# Patient Record
Sex: MALE | Race: OTHER | NOT HISPANIC OR LATINO | URBAN - METROPOLITAN AREA
[De-identification: names, ages, dates, MRNs, and addresses within clinical notes are randomized per-mention and may not be internally consistent; named-entity substitution may affect disease eponyms.]

---

## 2017-07-13 ENCOUNTER — EMERGENCY (EMERGENCY)
Facility: HOSPITAL | Age: 22
LOS: 1 days | Discharge: HOME | End: 2017-07-13
Attending: PEDIATRICS | Admitting: PEDIATRICS
Payer: COMMERCIAL

## 2017-07-13 VITALS
OXYGEN SATURATION: 99 % | SYSTOLIC BLOOD PRESSURE: 138 MMHG | DIASTOLIC BLOOD PRESSURE: 90 MMHG | RESPIRATION RATE: 17 BRPM | TEMPERATURE: 99 F | HEART RATE: 69 BPM

## 2017-07-13 VITALS
SYSTOLIC BLOOD PRESSURE: 109 MMHG | OXYGEN SATURATION: 98 % | DIASTOLIC BLOOD PRESSURE: 69 MMHG | TEMPERATURE: 99 F | HEART RATE: 73 BPM | RESPIRATION RATE: 18 BRPM

## 2017-07-13 PROCEDURE — 99282 EMERGENCY DEPT VISIT SF MDM: CPT

## 2017-07-13 PROCEDURE — 99283 EMERGENCY DEPT VISIT LOW MDM: CPT
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
## 2019-06-30 ENCOUNTER — EMERGENCY (EMERGENCY)
Facility: HOSPITAL | Age: 24
LOS: 1 days | Discharge: HOME | End: 2019-06-30
Attending: EMERGENCY MEDICINE
Payer: MEDICAID

## 2019-06-30 VITALS — HEIGHT: 64 IN | WEIGHT: 134.92 LBS

## 2019-06-30 VITALS
SYSTOLIC BLOOD PRESSURE: 125 MMHG | DIASTOLIC BLOOD PRESSURE: 75 MMHG | TEMPERATURE: 99 F | HEART RATE: 80 BPM | RESPIRATION RATE: 18 BRPM | OXYGEN SATURATION: 98 %

## 2019-06-30 PROCEDURE — 99283 EMERGENCY DEPT VISIT LOW MDM: CPT | Mod: 25

## 2019-06-30 PROCEDURE — 90715 TDAP VACCINE 7 YRS/> IM: CPT

## 2019-06-30 PROCEDURE — 99283 EMERGENCY DEPT VISIT LOW MDM: CPT

## 2019-06-30 PROCEDURE — 90471 IMMUNIZATION ADMIN: CPT

## 2019-06-30 PROCEDURE — 73130 X-RAY EXAM OF HAND: CPT

## 2019-06-30 PROCEDURE — 73130 X-RAY EXAM OF HAND: CPT | Mod: 26,RT

## 2019-06-30 RX ORDER — TETANUS TOXOID, REDUCED DIPHTHERIA TOXOID AND ACELLULAR PERTUSSIS VACCINE, ADSORBED 5; 2.5; 8; 8; 2.5 [IU]/.5ML; [IU]/.5ML; UG/.5ML; UG/.5ML; UG/.5ML
0.5 SUSPENSION INTRAMUSCULAR ONCE
Refills: 0 | Status: COMPLETED | OUTPATIENT
Start: 2019-06-30 | End: 2019-06-30

## 2019-06-30 RX ORDER — ACETAMINOPHEN 500 MG
650 TABLET ORAL ONCE
Refills: 0 | Status: COMPLETED | OUTPATIENT
Start: 2019-06-30 | End: 2019-06-30

## 2019-06-30 RX ORDER — IBUPROFEN 200 MG
600 TABLET ORAL ONCE
Refills: 0 | Status: COMPLETED | OUTPATIENT
Start: 2019-06-30 | End: 2019-06-30

## 2019-06-30 RX ADMIN — TETANUS TOXOID, REDUCED DIPHTHERIA TOXOID AND ACELLULAR PERTUSSIS VACCINE, ADSORBED 0.5 MILLILITER(S): 5; 2.5; 8; 8; 2.5 SUSPENSION INTRAMUSCULAR at 15:48

## 2019-06-30 RX ADMIN — Medication 600 MILLIGRAM(S): at 15:48

## 2019-06-30 RX ADMIN — Medication 650 MILLIGRAM(S): at 15:48

## 2019-06-30 RX ADMIN — Medication 650 MILLIGRAM(S): at 16:15

## 2019-06-30 RX ADMIN — Medication 600 MILLIGRAM(S): at 16:15

## 2019-06-30 NOTE — ED PROVIDER NOTE - NSFOLLOWUPINSTRUCTIONS_ED_ALL_ED_FT
Follow-up with your Primary Care Physician within 2 - 5 days.  Please return to the Emergency Department immediately for any new, worsening or concerning symptoms.    Can use Tylenol (up to 1000mg every 6 hours) or Ibuprofen (up to 600mg every 6 hours) as per package directions for pain - use only as needed.  These are over-the-counter medications - please respect the warnings on the label.

## 2019-06-30 NOTE — ED ADULT NURSE REASSESSMENT NOTE - NS ED NURSE REASSESS COMMENT FT1
Pt reports pain improvement following tylenol/motrin administration. Hever FINK state no fracture on XR. Hever FINK ace bandage right hand and place right arm in sling to keep right hand elevated to decrease swelling. Pt discharged home to follow up with PMD. CHANDRIKAS

## 2019-06-30 NOTE — ED PROVIDER NOTE - OBJECTIVE STATEMENT
23 year-old male p/w right palm pain after catching a ball while playing baseball; pain localized to the thenar eminence approx 5 hours PTA.  No numbness/tingling or weakness in fingers.  No other trauma/falls reported.  + swelling and pain; worsening hence prompting ER visit.  No prior procedures/injuries on hand.

## 2019-06-30 NOTE — ED PROVIDER NOTE - PHYSICAL EXAMINATION
*Gen: NAD, AAO*3  *HEENT: NC/AT, MMM, airway patent, trachea midline  *CV: RRR, S1/S2 present, no murmurs/rubs  *Resp: no respiratory distress, LCTAB, no wheezing/rales  *Abd: non-distended, soft N/Tx4, no guarding or rigidity  *Neuro: no focal neuro deficits, moving all limbs appropriately  *Extremities: no gross deformity, no scaphoid TTP, neurovascularly intact, TTP overlying thenar eminence w/o overlying redness/skin breaks  *Skin: no rashes, + abrasion overlying right knee w/o TTP  ~ Antoni Rapp M.D.

## 2019-06-30 NOTE — ED PROVIDER NOTE - ATTENDING CONTRIBUTION TO CARE
I performed a history and physical exam of the patient and discussed their management with the resident. I reviewed the resident's note and agree with the documented findings and plan of care.  Amira Kathleen MD

## 2019-06-30 NOTE — ED ADULT NURSE NOTE - OBJECTIVE STATEMENT
23y male coming in from home c/o hand injury. A&Ox3 and VSS. Denies medical hx. Reports he was playing baseball when the ball ricocheted off another players mitt and hit his right palm approx 5 hours ago. Pt presents to ED c/o 7/10 right thumb pain, specifically the thenar eminence. Area appears swollen. Denies taking pain medication prior to ED arrival. Pt able to wiggle all right fingers, denies numbness/tingling. Cap refill <2 sec. Abrasion also noted to right knee from slide during game. Denies hitting head and denies LOC. Unknown last tetanus.

## 2019-06-30 NOTE — ED PROVIDER NOTE - CLINICAL SUMMARY MEDICAL DECISION MAKING FREE TEXT BOX
23 year-old male p/w right palm pain likely soft tissue swelling; unlikely fracture.  Neurovascularly intact.  X-ray, pain medications and reassess.
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
## 2021-06-13 ENCOUNTER — EMERGENCY (EMERGENCY)
Facility: HOSPITAL | Age: 26
LOS: 1 days | Discharge: HOME | End: 2021-06-13
Attending: EMERGENCY MEDICINE
Payer: MEDICAID

## 2021-06-13 VITALS
HEIGHT: 64 IN | TEMPERATURE: 99 F | OXYGEN SATURATION: 99 % | SYSTOLIC BLOOD PRESSURE: 112 MMHG | HEART RATE: 77 BPM | RESPIRATION RATE: 20 BRPM | DIASTOLIC BLOOD PRESSURE: 75 MMHG | WEIGHT: 136.03 LBS

## 2021-06-13 PROCEDURE — 99283 EMERGENCY DEPT VISIT LOW MDM: CPT

## 2021-06-13 PROCEDURE — 73562 X-RAY EXAM OF KNEE 3: CPT

## 2021-06-13 PROCEDURE — 73562 X-RAY EXAM OF KNEE 3: CPT | Mod: 26,RT

## 2021-06-13 PROCEDURE — 99283 EMERGENCY DEPT VISIT LOW MDM: CPT | Mod: 25

## 2021-06-13 RX ORDER — IBUPROFEN 200 MG
600 TABLET ORAL ONCE
Refills: 0 | Status: COMPLETED | OUTPATIENT
Start: 2021-06-13 | End: 2021-06-13

## 2021-06-13 RX ADMIN — Medication 600 MILLIGRAM(S): at 12:38

## 2021-06-13 RX ADMIN — Medication 600 MILLIGRAM(S): at 14:35

## 2021-06-13 NOTE — ED PROVIDER NOTE - PATIENT PORTAL LINK FT
You can access the FollowMyHealth Patient Portal offered by [facility] by registering at the following website: [email or web address]. By joining [employer]’s FollowMyHealth portal, you will also be able to view your health information using other applications (apps) compatible with our system.

## 2021-06-13 NOTE — ED PROVIDER NOTE - OBJECTIVE STATEMENT
25y M with no PMHx presents to the ED c/o R knee pain s/p running into a base when playing baseball. Pt was trying to read the base when running when he extended and twisted a knee and heard a pop. Pt is ambulatory with pain. Denies prior issues with R knee. Pt states the pain is radiating to groin. No open wound or bleeding. 25y M with no PMHx presents to the ED c/o R knee pain s/p running into a base when playing baseball. When attempting to reach the base pt extended and twisted his knee, hearing a pop.  Pt is ambulatory with pain. Denies prior issues with R knee. Pt states the pain is radiating to groin. No open wound or bleeding.

## 2021-06-13 NOTE — ED PROVIDER NOTE - NSFOLLOWUPCLINICS_GEN_ALL_ED_FT
[facility] Orthopedic Surgery  Orthopedic Surgery  [address], [address] & [address] [place] [address]  Phone: [phone]  Fax:

## 2021-06-13 NOTE — ED PROVIDER NOTE - CLINICAL SUMMARY MEDICAL DECISION MAKING FREE TEXT BOX
Eulalio Mckeon (MD): Otherwise healthy 25y M presents c/o R knee injury s/p running into 1st base. Will r/o fracture and provide pain control.

## 2021-06-13 NOTE — ED PROVIDER NOTE - MUSCULOSKELETAL, MLM
Tenderness on the patellar region of the knee. No bony tenderness or effusion. FROM passively. Negative anterior posterior draw.

## 2021-06-13 NOTE — ED ADULT NURSE NOTE - OBJECTIVE STATEMENT
pt was playing baseball and slid   he cannot weight bear.  knee is now hurting up his thigh.  pulses and refil are without deficit

## 2023-01-05 NOTE — ED PROVIDER NOTE - NS_EDPROVIDERDISPOUSERTYPE_ED_A_ED
Scribe Attestation (For Scribes USE Only)... [Anxiety] : anxiety [Negative] : Heme/Lymph [Fever] : no fever [Chills] : no chills [Blurry Vision] : no blurred vision [Earache] : no earache [SOB] : no shortness of breath [Dyspnea on exertion] : not dyspnea during exertion Attending Attestation (For Attendings USE Only).../Scribe Attestation (For Scribes USE Only)...

## 2023-12-12 NOTE — ED ADULT TRIAGE NOTE - HISTORY OF COVID-19 VACCINATION
Yes Billing Type: Third-Party Bill Expected Date Of Service: 12/12/2023 Performing Laboratory: 0 Bill For Surgical Tray: no

## 2025-03-10 NOTE — ED ADULT NURSE NOTE - DATE/TIME PROVIDED
Detail Level: Detailed
Quality 47: Advance Care Plan: Advance Care Planning discussed and documented; advance care plan or surrogate decision maker documented in the medical record.
Quality 130: Documentation Of Current Medications In The Medical Record: Current Medications Documented
Quality 226: Preventive Care And Screening: Tobacco Use: Screening And Cessation Intervention: Patient screened for tobacco use and is an ex/non-smoker
13-Jul-2017 20:22